# Patient Record
Sex: FEMALE | Race: WHITE | NOT HISPANIC OR LATINO | Employment: OTHER | URBAN - METROPOLITAN AREA
[De-identification: names, ages, dates, MRNs, and addresses within clinical notes are randomized per-mention and may not be internally consistent; named-entity substitution may affect disease eponyms.]

---

## 2021-01-08 ENCOUNTER — TRANSFERRED RECORDS (OUTPATIENT)
Dept: HEALTH INFORMATION MANAGEMENT | Facility: CLINIC | Age: 71
End: 2021-01-08

## 2021-01-15 ENCOUNTER — TRANSFERRED RECORDS (OUTPATIENT)
Dept: HEALTH INFORMATION MANAGEMENT | Facility: CLINIC | Age: 71
End: 2021-01-15

## 2021-02-16 ENCOUNTER — TELEPHONE (OUTPATIENT)
Dept: CARDIOLOGY | Facility: CLINIC | Age: 71
End: 2021-02-16

## 2021-02-16 NOTE — TELEPHONE ENCOUNTER
02/16/21 Fax sent to [facility] Medical Records to request tracings from Ziopatch and Holter from 1/29/21 and EKG from 1/8/21.   Chon 330 pm

## 2021-02-17 NOTE — TELEPHONE ENCOUNTER
Spoke to Rosalinda in medical records at [facility] requesting trracing of recent EKG, 48 hour holter and Zio Patch.  She indicated she received the fax request and will put a rush on getting the documents over to us for appt tomorrow with Dr Vuong.  VINOD Naranjo

## 2021-02-17 NOTE — TELEPHONE ENCOUNTER
Received only EKG tracing from 1/8/2021.  Called an spoke to Cecil who reports there are more tracings coming for the 48 hour holter and Zio Patch.  She thought they would be coming soon.  If we have any further issues we were to refer to request number [id number].  VINOD Naranjo

## 2021-02-18 ENCOUNTER — OFFICE VISIT (OUTPATIENT)
Dept: CARDIOLOGY | Facility: CLINIC | Age: 71
End: 2021-02-18
Payer: COMMERCIAL

## 2021-02-18 VITALS
OXYGEN SATURATION: 96 % | WEIGHT: 191.1 LBS | SYSTOLIC BLOOD PRESSURE: 148 MMHG | HEART RATE: 105 BPM | DIASTOLIC BLOOD PRESSURE: 92 MMHG

## 2021-02-18 DIAGNOSIS — R00.2 PALPITATIONS: ICD-10-CM

## 2021-02-18 DIAGNOSIS — I48.0 PAROXYSMAL ATRIAL FIBRILLATION (H): Primary | ICD-10-CM

## 2021-02-18 PROCEDURE — 99204 OFFICE O/P NEW MOD 45 MIN: CPT | Performed by: INTERNAL MEDICINE

## 2021-02-18 PROCEDURE — 93000 ELECTROCARDIOGRAM COMPLETE: CPT | Performed by: INTERNAL MEDICINE

## 2021-02-18 RX ORDER — METOPROLOL SUCCINATE 50 MG/1
50 TABLET, EXTENDED RELEASE ORAL DAILY
Qty: 30 TABLET | Refills: 11 | Status: SHIPPED | OUTPATIENT
Start: 2021-02-18 | End: 2021-02-24

## 2021-02-18 NOTE — TELEPHONE ENCOUNTER
Spoke to Rosalinda and let her know that the ZP monitor strips have still not arrived.  She will put a rush on getting the monitor strips over and is aware that OV is this afternoon. Som

## 2021-02-18 NOTE — PATIENT INSTRUCTIONS
1.  You have paroxysmal atrial fibrillation.  On occasion, your heart rate becomes irregular and races.  Typically, your episodes only last few minutes.  We picked up a 25-minute AF episode on your recent cardiac monitor (this was the longest).  2.  This condition increases your risk of stroke.  We recommend you start a blood thinner called Eliquis.  This should be taken twice daily.  3.  Please start a medication called metoprolol XL which is a beta-blocker.  This will help your heart race less and perhaps prevent episodes of atrial fibrillation.  Please start half a tablet (25 mg) daily.  If you have no issues, please increase to the full 50 mg tablet 1 week later.  4.  Echocardiogram.  5.  We will see her again in 4 to 6 weeks.  6.  Because atrial fibrillation is frequently on the basis of sleep apnea, we will refer you to a sleep specialist for evaluation.

## 2021-02-18 NOTE — PROGRESS NOTES
HPI and Plan:   See dictation [id number]    Orders Placed This Encounter   Procedures     SLEEP EVALUATION & MANAGEMENT REFERRAL - [facility] [phone]  (Age 18 and up)     Follow-Up with Cardiac Advanced Practice Provider     EKG 12-lead complete w/read - Clinics (performed today)     Echocardiogram Complete       Orders Placed This Encounter   Medications     apixaban ANTICOAGULANT (ELIQUIS ANTICOAGULANT) 5 MG tablet     Sig: Take 1 tablet (5 mg) by mouth 2 times daily     Dispense:  60 tablet     Refill:  11     metoprolol succinate ER (TOPROL XL) 50 MG 24 hr tablet     Sig: Take 1 tablet (50 mg) by mouth daily     Dispense:  30 tablet     Refill:  11       There are no discontinued medications.      Encounter Diagnoses   Name Primary?     Paroxysmal atrial fibrillation (H) Yes     Palpitations        CURRENT MEDICATIONS:  Current Outpatient Medications   Medication Sig Dispense Refill     apixaban ANTICOAGULANT (ELIQUIS ANTICOAGULANT) 5 MG tablet Take 1 tablet (5 mg) by mouth 2 times daily 60 tablet 11     metoprolol succinate ER (TOPROL XL) 50 MG 24 hr tablet Take 1 tablet (50 mg) by mouth daily 30 tablet 11       ALLERGIES     Allergies   Allergen Reactions     Penicillins        PAST MEDICAL HISTORY:  History reviewed. No pertinent past medical history.    PAST SURGICAL HISTORY:  History reviewed. No pertinent surgical history.    FAMILY HISTORY:  History reviewed. No pertinent family history.    SOCIAL HISTORY:  Social History     Socioeconomic History     Marital status:      Spouse name: None     Number of children: None     Years of education: None     Highest education level: None   Occupational History     None   Social Needs     Financial resource strain: None     Food insecurity     Worry: None     Inability: None     Transportation needs     Medical: None     Non-medical: None   Tobacco Use     Smoking status: Never Smoker     Smokeless tobacco: Never Used    Substance and Sexual Activity     Alcohol use: Not Currently     Drug use: None     Sexual activity: None   Lifestyle     Physical activity     Days per week: None     Minutes per session: None     Stress: None   Relationships     Social connections     Talks on phone: None     Gets together: None     Attends Nondenominational service: None     Active member of club or organization: None     Attends meetings of clubs or organizations: None     Relationship status: None     Intimate partner violence     Fear of current or ex partner: None     Emotionally abused: None     Physically abused: None     Forced sexual activity: None   Other Topics Concern     Parent/sibling w/ CABG, MI or angioplasty before 65F 55M? Not Asked   Social History Narrative     None       Review of Systems:  Skin:  Negative       Eyes:  Positive for glasses    ENT:  Negative      Respiratory:  Negative       Cardiovascular:    palpitations;Positive for;lightheadedness    Gastroenterology: Negative      Genitourinary:  Negative      Musculoskeletal:  Negative      Neurologic:  Negative      Psychiatric:  Positive for anxiety    Heme/Lymph/Imm:  Negative      Endocrine:  Negative

## 2021-02-18 NOTE — LETTER
2/18/2021    Tara Ann Nelson, MD [facility] [address] [place] [address]    RE: Sheri Joseph       Dear Colleague,    I had the pleasure of seeing Sheri Joseph in the [facility].    HPI and Plan:   See dictation [id number]    Orders Placed This Encounter   Procedures     SLEEP EVALUATION & MANAGEMENT REFERRAL - [facility] [phone]  (Age 18 and up)     Follow-Up with Cardiac Advanced Practice Provider     EKG 12-lead complete w/read - Clinics (performed today)     Echocardiogram Complete       Orders Placed This Encounter   Medications     apixaban ANTICOAGULANT (ELIQUIS ANTICOAGULANT) 5 MG tablet     Sig: Take 1 tablet (5 mg) by mouth 2 times daily     Dispense:  60 tablet     Refill:  11     metoprolol succinate ER (TOPROL XL) 50 MG 24 hr tablet     Sig: Take 1 tablet (50 mg) by mouth daily     Dispense:  30 tablet     Refill:  11       There are no discontinued medications.      Encounter Diagnoses   Name Primary?     Paroxysmal atrial fibrillation (H) Yes     Palpitations        CURRENT MEDICATIONS:  Current Outpatient Medications   Medication Sig Dispense Refill     apixaban ANTICOAGULANT (ELIQUIS ANTICOAGULANT) 5 MG tablet Take 1 tablet (5 mg) by mouth 2 times daily 60 tablet 11     metoprolol succinate ER (TOPROL XL) 50 MG 24 hr tablet Take 1 tablet (50 mg) by mouth daily 30 tablet 11       ALLERGIES     Allergies   Allergen Reactions     Penicillins        PAST MEDICAL HISTORY:  History reviewed. No pertinent past medical history.    PAST SURGICAL HISTORY:  History reviewed. No pertinent surgical history.    FAMILY HISTORY:  History reviewed. No pertinent family history.    SOCIAL HISTORY:  Social History     Socioeconomic History     Marital status:      Spouse name: None     Number of children: None     Years of education: None     Highest education level: None   Occupational  History     None   Social Needs     Financial resource strain: None     Food insecurity     Worry: None     Inability: None     Transportation needs     Medical: None     Non-medical: None   Tobacco Use     Smoking status: Never Smoker     Smokeless tobacco: Never Used   Substance and Sexual Activity     Alcohol use: Not Currently     Drug use: None     Sexual activity: None   Lifestyle     Physical activity     Days per week: None     Minutes per session: None     Stress: None   Relationships     Social connections     Talks on phone: None     Gets together: None     Attends Orthodoxy service: None     Active member of club or organization: None     Attends meetings of clubs or organizations: None     Relationship status: None     Intimate partner violence     Fear of current or ex partner: None     Emotionally abused: None     Physically abused: None     Forced sexual activity: None   Other Topics Concern     Parent/sibling w/ CABG, MI or angioplasty before 65F 55M? Not Asked   Social History Narrative     None       Review of Systems:  Skin:  Negative       Eyes:  Positive for glasses    ENT:  Negative      Respiratory:  Negative       Cardiovascular:    palpitations;Positive for;lightheadedness    Gastroenterology: Negative      Genitourinary:  Negative      Musculoskeletal:  Negative      Neurologic:  Negative      Psychiatric:  Positive for anxiety    Heme/Lymph/Imm:  Negative      Endocrine:  Negative                      Thank you for allowing me to participate in the care of your patient.      Sincerely,     Tony Vuong MD     [facility]  cc:   No referring provider defined for this encounter.

## 2021-02-18 NOTE — PROGRESS NOTES
Service Date: 02/18/2021      HISTORY OF PRESENT ILLNESS:    It is my pleasure seeing Ms. Sheri Joseph, a delightful 70-year-old female who has been referred for evaluation of palpitation.      Approximately 18 months ago, the patient was monitoring her heart rate meticulously as part of an exercise program prescribed by her .  She noticed high and low heart rates on her Apple watch.  She felt well overall and did not pay much attention to this.      In 11/2020 shortly after returning home from a walk, she felt her heart racing for 3-4 minutes.       In 12/2020, she had a 15-20 minute episode of heart racing where she felt chest tightness and was mildly lightheaded.  This event prompted further evaluation.  Her primary care provider ordered a 14-day Zio Patch monitor.  I have reviewed this monitor which shows paroxysmal atrial fibrillation with impressive RVR, rates in the 170-200 range.  The longest event lasted approximately 25 minutes.  [place] was less than 1%.      The patient has a past medical history of GERD for which she previously took omeprazole.  She is currently on no daily medication.  She does not have history of hypertension or other cardiovascular problem.      She is physically active without chest pain or other symptoms during exertion.  She lives with her  in [facility].  She is a nonsmoker, nondrinker.  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      FAMILY HISTORY:  Negative for premature heart disease or atrial fibrillation.        PHYSICAL EXAMINATION:   VITAL SIGNS:  Blood pressure 148/92, heart rate 95 and regular, weight 86.7 kg.   GENERAL:  She is an extremely pleasant woman in no distress.   HEENT:  Normocephalic, atraumatic.   NECK:  Supple without carotid bruits.  Normal JVP.   LUNGS:  Clear.   CARDIOVASCULAR:  Regular rhythm without gallop, murmur or rub.   ABDOMEN:  Soft, nontender.   EXTREMITIES:  No edema.    SKIN:  No rashes.        DIAGNOSTIC STUDIES:    I personally reviewed the following:  - Recent laboratory tests:  TSH 0.87, sodium 140, potassium 4.3, creatinine 0.76, hematocrit 45%.     - 12-lead ECG today showed sinus rhythm and was within normal limits.        IMPRESSION:   1.  Paroxysmal atrial fibrillation.  Ms. Joseph is a delightful 70-year-old female with recent diagnosis of paroxysmal atrial fibrillation.  Low burden (<1%), though episodes are typically associated with very rapid ventricular rates and significant symptoms.      There is no apparent cause for atrial fibrillation other than age and possibly sleep apnea.  The patient is known to snore, but her overall sleep quality is decent.  Referral to Sleep Medicine was discussed.      With regard to anticoagulation, her [id number]-[id number] score is probably 2 (age, gender) and perhaps 3 (she was mildly hypertensive today).  Start apixaban.      With regard to rhythm versus rate control, I would favor the latter for now.  I discussed with the patient we have a broad range of therapeutic interventions for AF.  As a first step, I recommended a beta-blocker.  If this fails, we would consider flecainide or catheter ablation.      We discussed lifestyle measures that may decrease the risk of atrial fibrillation such as weight loss, continuation of regular exercise and avoidance of alcohol.      RECOMMENDATIONS:   A.  Start apixaban 5 mg b.i.d.   B.  Start Toprol-XL 25 mg daily.  If she has no side effects, increase to 50 mg daily after 1 week.   C.  Echocardiogram.   D.  Follow up with EP MARIA ISABEL in 4-6 weeks.   E.  Referral to sleep specialist.   F.  Monitor BP.      It was my pleasure seeing Ms. Joseph today.  Please feel free to contact me with any questions.        IQRA ULLOA MD, [facility]          cc:   Roberta Thayer MD   [facility]   [address] [place]  [address]          D: 02/18/2021   T: 02/18/2021   MT: lauren      Name:     IESHA  DOMINGUEZ   MRN:      [id number]        Account:      [id number]   :      1950           Service Date: 2021      Document: [id number]

## 2021-02-18 NOTE — LETTER
2/18/2021      Tara Ann Nelson, MD [facility] [address] [facility] [address]      RE: Sheri Joseph       Dear Colleague,    I had the pleasure of seeing Sheri Joseph in the [facility].    Service Date: 02/18/2021      HISTORY OF PRESENT ILLNESS:  It is my pleasure seeing Ms. Sheri Joseph, a delightful 70-year-old female who has been referred for evaluation of palpitation.      Approximately 18 months ago, the patient was monitoring her heart rate meticulously as part of a training program prescribed by her .  She noticed that there were some high and low heart rates on her Apple watch.  She felt well overall and did not pay much attention to this.      In 11/2020 after coming back home from a walk, she felt her heart racing for 3 or 4 minutes.  The arrhythmia spontaneously subsided.      In 12/2020, she had a 15 to 20-minute episode of heart racing where she felt chest tightness and was mildly lightheaded.  This event prompted further evaluation.  Her primary care provider ordered a 14-day Zio Patch monitor.  I have reviewed this cardiac monitor which shows paroxysmal atrial fibrillation with impressive RVR, rates in the 170-200 range.  The longest event lasted approximately 25 minutes.  Overall burden was less than 1%.      The patient has a past medical history of GERD for which she took omeprazole for a while.  She is currently on no medication.  She does not have history of hypertension or other cardiovascular problem.      She is physically active without chest pain or other symptoms during exertion.  She lives with her  in [facility].  She is a nonsmoker, nondrinker.  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      FAMILY HISTORY:  Negative for premature heart disease or atrial fibrillation.      PHYSICAL EXAMINATION:   VITAL SIGNS:  Blood pressure  148/92, heart rate 95 and regular, weight 86.7 kg.   GENERAL:  She is an extremely pleasant woman in no distress.   HEENT:  Normocephalic, atraumatic.   NECK:  Supple without carotid bruits.  Normal JVP.   LUNGS:  Clear.   CARDIOVASCULAR:  Regular rhythm without gallop, murmur or rub.   ABDOMEN:  Soft, nontender.   EXTREMITIES:  No edema.   SKIN:  No rashes.      DIAGNOSTIC STUDIES:  Recent laboratory tests:  TSH 0.87, sodium 140, potassium 4.3, creatinine 0.76, hematocrit 45%.        Her 12-lead ECG today showed sinus rhythm and was within normal limits.      IMPRESSION:   1.  Paroxysmal atrial fibrillation.  Ms. Joseph is a delightful 70-year-old female with recent diagnosis of paroxysmal atrial fibrillation.  Overall, low burden (less than 1%), but her brief episodes are typically associated with very rapid ventricular rate, which causes significant symptoms.      There is no apparent cause for atrial fibrillation other than age and possibly sleep apnea.  The patient is known to snore, but her sleep quality is generally decent.  Referral to Sleep Medicine is reasonable.      With regard to anticoagulation, her CHADS2-VASc score is probably 2 (age, gender) and perhaps 3 (she was mildly hypertensive today).  I will start apixaban.      With regard to rhythm versus rate control, I would favor the latter for now.  I discussed with the patient we have a broad range of therapeutic interventions for AFib.  As a first step, I would recommend a trial of beta blocker.  If this fails, I would then consider either flecainide or catheter ablation.      We also discussed lifestyle measures that may decrease the risk of atrial fibrillation such as weight loss, continuation of regular exercise and avoidance of alcohol.      RECOMMENDATIONS:   A.  Start apixaban 5 mg b.i.d.   B.  Start Toprol-XL 25 mg daily.  If she has no side effects, increase to 50 mg daily after 1 week.   C.  Echocardiogram.   D.  Follow up with MARIA ISABEL in 4-6  weeks.   E.  Referral to sleep specialist.      It was my pleasure seeing Ms. Joseph today.  Please feel free to contact me with any questions.      cc:   Roberta Thayer MD   [facility]   [address] [place]  [address]         IQRA ULLOA MD         D: 2021   T: 2021   MT: lauren      Name:     DOMINGUEZ JOSEPH   MRN:      [id number]        Account:      [id number]   :      1950           Service Date: 2021      Document: [id number]        Outpatient Encounter Medications as of 2021   Medication Sig Dispense Refill     apixaban ANTICOAGULANT (ELIQUIS ANTICOAGULANT) 5 MG tablet Take 1 tablet (5 mg) by mouth 2 times daily 60 tablet 11     metoprolol succinate ER (TOPROL XL) 50 MG 24 hr tablet Take 1 tablet (50 mg) by mouth daily 30 tablet 11     No facility-administered encounter medications on file as of 2021.        Again, thank you for allowing me to participate in the care of your patient.      Sincerely,    Iqra Ulloa MD     [facility]

## 2021-02-22 ENCOUNTER — TELEPHONE (OUTPATIENT)
Dept: CARDIOLOGY | Facility: CLINIC | Age: 71
End: 2021-02-22

## 2021-02-22 DIAGNOSIS — I48.0 PAROXYSMAL ATRIAL FIBRILLATION (H): ICD-10-CM

## 2021-02-22 NOTE — TELEPHONE ENCOUNTER
Patient called to report since starting metoprolol (2/19) she has been feeling lousy.  Reports fatigue, SOB, leg heaviness and foggy.  Reports HR varying and can be as high as 100.  Not sure if she is having side effect of the medications.or from having bouts of AF.  Currently taking metoprolol in the morning.  Recommended that patient try to take medication in the evening to see if her symptoms improve.  Also keep diary of HR and symptoms when elevated.  Asked patient to call with update on Thursday to see if symptoms improve.  Patient provided verbal understanding regarding above.  VINOD Naranjo

## 2021-02-22 NOTE — TELEPHONE ENCOUNTER
02/22/21 EKG tracing dated 01/08/21 and Ziopatch tracings dated 01/15/21-01/19/21 recd from [facility]. Sent to HIM for scanning.  Chon Gannon5 am

## 2021-02-24 RX ORDER — METOPROLOL SUCCINATE 50 MG/1
50 TABLET, EXTENDED RELEASE ORAL AT BEDTIME
Qty: 30 TABLET | Refills: 11
Start: 2021-02-24 | End: 2021-03-02 | Stop reason: SINTOL

## 2021-02-24 NOTE — TELEPHONE ENCOUNTER
I would stop Metoprolol. If she would like to try Flecainide rather than ablation then we should change her echo to exercise nucl prior to start Flecainide 50 mg bid. If wants an ablation she can talk to Dr. Vuong next week. qp

## 2021-02-24 NOTE — TELEPHONE ENCOUNTER
02/24/21  Msg recd from Dr Bartlett  I would stop Metoprolol. If she would like to try Flecainide rather than ablation then we should change her echo to exercise nucl prior to start Flecainide 50 mg bid. If wants an ablation she can talk to Dr. Vuong next week  Spoke with pt and explained recommendation per Dr Bartlett  In Dr Vuong absence.  Pt stated she has not tried taking the Metoprolol at bedtime which was recommended to her a few days ago before making any other changes.  Plan was made for pt to take at HS and call back by end of week if symptoms have not subsided. If feeling better she will call early next week with update. Echo planned for tomorrow morning moved out until 3/8 incase pt needs to stop Metoprolol and initiate Flecainide.   Pt voiced understanding and agreement with plan.   Chon 450 pm

## 2021-02-24 NOTE — TELEPHONE ENCOUNTER
"02/24/21 Pt called again with complaints of not feeling well after Metoprolol 25 mg ER was started on 2/19. . Today she has had 2 episodes of feeling \" extremely weak and fatigued\" . She was so tired this moring she couldn't get herself out of bed. Later this morning she had a short episode of feeling \"pins and needles \" in her feet which concerned her.. BP today has been 118/79 and pulse running 60-70 on her Apple Watch.   Will check with Dr Bartlett in Dr Vuong absence and call pt back w recommendations.  Pt voiced understanding and agreement with plan.   Chon 247 pm  "

## 2021-03-01 NOTE — TELEPHONE ENCOUNTER
Patient called with update--she switched her metoprolol XL to evening and did feel it helped some with her fatigue and weakness however continues to not feel well.  Patient unsure if her feeling of fatigue is related to her having bouts of AF and feeling drained after it is over or the medication.  She also admits to having anxiety which is untreated.  She reports she has discussed this with her PCP and it has never been addressed.  She reports her apple watch (series 3 does not record AF episodes) showed she had a 30 minute episode while sleeping last night where her HR was 118.  Will update Dr Vuong regarding above for recommendations.  VINOD Naranjo

## 2021-03-01 NOTE — TELEPHONE ENCOUNTER
Message left for patient to review recommendations per Dr Vuong:.  Awaiting return call from patient.  VINOD Naranjo

## 2021-03-01 NOTE — TELEPHONE ENCOUNTER
"In that case, let's stop metoprolol.    Let's try dilt  mg daily instead.  This is less likely to cause severe fatigue.     Her AF is associated with impressive RVR (we saw it on her ZP).  Would avoid flecainide as \"solo therapy\" given such rapid AF.  Flecainide needs to be used TOGETHER with AV node controlling agent.  We may it add later (to the dilt, if she tolerates it),     Daviex, DI  "

## 2021-03-02 ENCOUNTER — VIRTUAL VISIT (OUTPATIENT)
Dept: SLEEP MEDICINE | Facility: CLINIC | Age: 71
End: 2021-03-02
Attending: INTERNAL MEDICINE
Payer: COMMERCIAL

## 2021-03-02 VITALS — BODY MASS INDEX: 31.76 KG/M2 | WEIGHT: 186 LBS | HEIGHT: 64 IN

## 2021-03-02 DIAGNOSIS — R29.818 SUSPECTED SLEEP APNEA: Primary | ICD-10-CM

## 2021-03-02 DIAGNOSIS — I48.0 PAROXYSMAL ATRIAL FIBRILLATION (H): ICD-10-CM

## 2021-03-02 DIAGNOSIS — R06.83 SNORING: ICD-10-CM

## 2021-03-02 PROCEDURE — 99204 OFFICE O/P NEW MOD 45 MIN: CPT | Mod: 95 | Performed by: INTERNAL MEDICINE

## 2021-03-02 RX ORDER — DILTIAZEM HYDROCHLORIDE 180 MG/1
180 CAPSULE, COATED, EXTENDED RELEASE ORAL DAILY
Qty: 30 CAPSULE | Refills: 3 | Status: SHIPPED | OUTPATIENT
Start: 2021-03-02 | End: 2021-06-21

## 2021-03-02 ASSESSMENT — MIFFLIN-ST. JEOR: SCORE: 1343.69

## 2021-03-02 NOTE — PROGRESS NOTES
Sheri is a 70 year old who is being evaluated via a billable video visit.      How would you like to obtain your AVS? MyChart  If the video visit is dropped, the invitation should be resent by: Send to e-mail at: [email or web address]  Will anyone else be joining your video visit? No       Anjana Orellana, RONNIE on 3/2/2021 at 9:11 AM    Video Start Time: 10:00 AM  Video-Visit Details    Type of service:  Video Visit    Video End Time:10:25 am    Originating Location (pt. Location): Home    Distant Location (provider location):  [facility] [place]     Platform used for Video Visit: Ardelyx    Sleep Consultation:    Date on this visit: 3/2/2021    Sheri Joseph is sent by Tony Montano for a sleep consultation regarding possible sleep apnea.    Primary Physician: Roberta Thayer     Sheri Joseph is a 70 years old female with paroxysmal atrial fibrillation who is sent for an evaluation of sleep disordered breathing by Cardiology.      Mrs. Sheri Joseph reports that she has never been a good sleeper. She has difficulty falling asleep and have frequent arousals during the night. 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 However, she did not consider her symptomatic burden to be high to warrant a sleep evaluation.     Since starting Metoprolol for rate control, she has become more tired.     Sheri does snore frequently. Patient does have a regular bed partner. There is report of snoring and poor quality of sleep.  She does not have witnessed apneas. They never sleep separately.  Patient sleeps on her side. She denies no morning headaches and restless legs. Sheri denies any bruxism, sleep walking, sleep talking, dream enactment, sleep paralysis, cataplexy and hypnogogic/hypnopompic hallucinations.    Sheri goes to sleep at 10:00 PM during the week. She wakes up at 7:00 AM without an alarm. She falls asleep in 30 minutes to 2 hours of reading  in bed.  Sheri has difficulty falling asleep.  She wakes up 3-4 times a night for 10- 20 minutes before falling back to sleep.  Sheri wakes up to uncertain reasons.  On weekends, Sheri goes to sleep at 10:00 PM.  She wakes up at 7:00 AM without an alarm. She falls asleep in 60 minutes.  Patient gets an average of 7 hours of sleep per night.     Sheri denies difficulty breathing through her nose.      Patient's [place] Sleepiness score 5/24 consistent with no daytime sleepiness.      Sheri naps 0 times per week . She takes no inadvertant naps.  She denies closing eyes, dozing and falling asleep while driving.  Patient was counseled on the importance of driving while alert, to pull over if drowsy, or nap before getting into the vehicle if sleepy.      She uses 1 cups/day of coffee. Last caffeine intake is usually before noon.    Allergies:    Allergies   Allergen Reactions     Penicillins        Medications:    Current Outpatient Medications   Medication Sig Dispense Refill     apixaban ANTICOAGULANT (ELIQUIS ANTICOAGULANT) 5 MG tablet Take 1 tablet (5 mg) by mouth 2 times daily 60 tablet 11     metoprolol succinate ER (TOPROL XL) 50 MG 24 hr tablet Take 1 tablet (50 mg) by mouth At Bedtime 30 tablet 11       Problem List:  There are no active problems to display for this patient.       Past Medical/Surgical History:  Past Medical History:   Diagnosis Date     Paroxysmal A-fib (H)      No past surgical history on file.    Social History:  Social History     Socioeconomic History     Marital status:      Spouse name: Not on file     Number of children: Not on file     Years of education: Not on file     Highest education level: Not on file   Occupational History     Not on file   Social Needs     Financial resource strain: Not on file     Food insecurity     Worry: Not on file     Inability: Not on file     Transportation needs     Medical: Not on file     Non-medical: Not on file   Tobacco Use     Smoking  status: Never Smoker     Smokeless tobacco: Never Used   Substance and Sexual Activity     Alcohol use: Not Currently     Drug use: Not on file     Sexual activity: Not on file   Lifestyle     Physical activity     Days per week: Not on file     Minutes per session: Not on file     Stress: Not on file   Relationships     Social connections     Talks on phone: Not on file     Gets together: Not on file     Attends Mandaen service: Not on file     Active member of club or organization: Not on file     Attends meetings of clubs or organizations: Not on file     Relationship status: Not on file     Intimate partner violence     Fear of current or ex partner: Not on file     Emotionally abused: Not on file     Physically abused: Not on file     Forced sexual activity: Not on file   Other Topics Concern     Parent/sibling w/ CABG, MI or angioplasty before 65F 55M? Not Asked   Social History Narrative     Not on file       Family History:    Both parents had sleep apnea.     Review of Systems:  A complete review of systems reviewed by me is negative with the exeption of what has been mentioned in the history of present illness.  CONSTITUTIONAL: NEGATIVE for weight gain/loss, fever, chills, sweats or night sweats, drug allergies.  EYES: NEGATIVE for changes in vision, blind spots, double vision.  ENT: NEGATIVE for ear pain, sore throat, sinus pain, post-nasal drip, runny nose, bloody nose  CARDIAC: NEGATIVE for fast heartbeats or fluttering in chest, chest pain or pressure, breathlessness when lying flat, swollen legs or swollen feet.  NEUROLOGIC: NEGATIVE headaches, weakness or numbness in the arms or legs.  DERMATOLOGIC: NEGATIVE for rashes, new moles or change in mole(s)  PULMONARY: NEGATIVE SOB at rest, SOB with activity, dry cough, productive cough, coughing up blood, wheezing or whistling when breathing.    GASTROINTESTINAL: NEGATIVE for nausea or vomitting, loose or watery stools, fat or grease in stools,  "constipation, abdominal pain, bowel movements black in color or blood noted.  GENITOURINARY: NEGATIVE for pain during urination, blood in urine, urinating more frequently than usual, irregular menstrual periods.  MUSCULOSKELETAL: NEGATIVE for muscle pain, bone or joint pain, swollen joints.  ENDOCRINE: NEGATIVE for increased thirst or urination, diabetes.  LYMPHATIC: NEGATIVE for swollen lymph nodes, lumps or bumps in the breasts or nipple discharge.    Physical Examination:  Vitals: Ht 1.626 m (5' 4\")   Wt 84.4 kg (186 lb)   BMI 31.93 kg/m    BMI= Body mass index is 31.93 kg/m .         [place] Total Score 2/27/2021   Total score - [place] 5            GENERAL APPEARANCE: healthy, alert, active and no distress  RESP: Negative for cough or dyspnea   NEURO: mentation intact and speech normal  PSYCH: mentation appears normal and affect normal/bright      Impression/Plan:    1. To rule out obstructive sleep apnea  2. Paroxysmal atrial fibrillation     Patient is a 70 years old female, with paroxysmal atrial fibrillation, who has a history of snoring, sleep maintenance difficulty and daytime fatigue. There is an intermediate risk for sleep apnea and an overnight sleep study is recommended to assess for sleep disordered breathing exacerbating arrhythmia risk. Pre test probability for moderate to severe sleep apnea is low and a in lab PSG is recommended for assessment.     Plan:     1. PSG for assessment of sleep apnea       Literature provided regarding sleep apnea.      She will follow up with me in approximately two weeks after her sleep study has been competed to review the results and discuss plan of care.       Polysomnography & HST reviewed.  Obstructive sleep apnea reviewed.  Complications of untreated sleep apnea were reviewed.    I spent a total of 45 minutes for this appointment today which include time spent before, during and after the visit for patient care, counseling and coordination of care.    " Taj Pickens     CC: Tony Ruiz Is*

## 2021-03-02 NOTE — TELEPHONE ENCOUNTER
"Spoke to patient with recommendations per Dr Vuong:  ----In that case, let's stop metoprolol.  ----Let's try dilt  mg daily instead.  This is less likely to cause severe fatigue.   Her AF is associated with impressive RVR (we saw it on her ZP).  Would avoid flecainide as \"solo therapy\" given such rapid AF.  Flecainide needs to be used TOGETHER with AV node controlling agent.  We may it add later (to the dilt, if she tolerates it),  Patient agreed with plan. Medication list updated in epic.  Will send to preferred pharmacy ([facility]). Patient requesting 30 days only at a time.   Asked patient to call with update after she has taken it for a few days. Patient provided verbal understanding regarding above.  VINOD Naranjo  "

## 2021-03-02 NOTE — PATIENT INSTRUCTIONS
Your BMI is Body mass index is 31.93 kg/m .  Weight management is a personal decision.  If you are interested in exploring weight loss strategies, the following discussion covers the approaches that may be successful. Body mass index (BMI) is one way to tell whether you are at a healthy weight, overweight, or obese. It measures your weight in relation to your height.  A BMI of 18.5 to 24.9 is in the healthy range. A person with a BMI of 25 to 29.9 is considered overweight, and someone with a BMI of 30 or greater is considered obese. More than two-thirds of American adults are considered overweight or obese.  Being overweight or obese increases the risk for further weight gain. Excess weight may lead to heart disease and diabetes.  Creating and following plans for healthy eating and physical activity may help you improve your health.  Weight control is part of healthy lifestyle and includes exercise, emotional health, and healthy eating habits. Careful eating habits lifelong are the mainstay of weight control. Though there are significant health benefits from weight loss, long-term weight loss with diet alone may be very difficult to achieve- studies show long-term success with dietary management in less than 10% of people. Attaining a healthy weight may be especially difficult to achieve in those with severe obesity. In some cases, medications, devices and surgical management might be considered.  What can you do?  If you are overweight or obese and are interested in methods for weight loss, you should discuss this with your provider.     Consider reducing daily calorie intake by 500 calories.     Keep a food journal.     Avoiding skipping meals, consider cutting portions instead.    Diet combined with exercise helps maintain muscle while optimizing fat loss. Strength training is particularly important for building and maintaining muscle mass. Exercise helps reduce stress, increase energy, and improves fitness.  Increasing exercise without diet control, however, may not burn enough calories to loose weight.       Start walking three days a week 10-20 minutes at a time    Work towards walking thirty minutes five days a week     Eventually, increase the speed of your walking for 1-2 minutes at time    In addition, we recommend that you review healthy lifestyles and methods for weight loss available through the National Institutes of Health patient information sites:  http://win.niddk.nih.gov/publications/index.htm    And look into health and wellness programs that may be available through your health insurance provider, employer, local community center, or gisele club.    Weight management plan: Patient was referred to their PCP to discuss a diet and exercise plan.

## 2021-03-08 ENCOUNTER — HOSPITAL ENCOUNTER (OUTPATIENT)
Dept: CARDIOLOGY | Facility: CLINIC | Age: 71
Discharge: HOME | End: 2021-03-08
Attending: INTERNAL MEDICINE | Admitting: INTERNAL MEDICINE
Payer: COMMERCIAL

## 2021-03-08 DIAGNOSIS — I48.0 PAROXYSMAL ATRIAL FIBRILLATION (H): ICD-10-CM

## 2021-03-08 PROCEDURE — 93306 TTE W/DOPPLER COMPLETE: CPT | Mod: 26 | Performed by: INTERNAL MEDICINE

## 2021-03-08 PROCEDURE — 93306 TTE W/DOPPLER COMPLETE: CPT

## 2021-03-16 ENCOUNTER — TELEPHONE (OUTPATIENT)
Dept: CARDIOLOGY | Facility: CLINIC | Age: 71
End: 2021-03-16

## 2021-03-16 NOTE — TELEPHONE ENCOUNTER
3/16/21 Called pt in follow up to changing from Metoprolol to Diltiazem on 3/1. She states her HR has been controlled. According to her Apple watch, Hrs have been mostly in the 60's. At rest it has gone as low as 48.  She stated she still has some numbness and tingling in her hands and feet and her hands become discolored when she goes on walks but this seems to be improving.   Overall she thinks she is doing better. INfromed pt I will add information so Sarah is aware for OV tomorrow.  Pt voiced understanding and agreement with plan.   Chon 10 am

## 2021-03-17 ENCOUNTER — TELEPHONE (OUTPATIENT)
Dept: CARDIOLOGY | Facility: CLINIC | Age: 71
End: 2021-03-17

## 2021-03-17 ENCOUNTER — OFFICE VISIT (OUTPATIENT)
Dept: CARDIOLOGY | Facility: CLINIC | Age: 71
End: 2021-03-17
Payer: COMMERCIAL

## 2021-03-17 VITALS
HEIGHT: 64 IN | WEIGHT: 189 LBS | DIASTOLIC BLOOD PRESSURE: 80 MMHG | SYSTOLIC BLOOD PRESSURE: 132 MMHG | HEART RATE: 90 BPM | BODY MASS INDEX: 32.27 KG/M2

## 2021-03-17 DIAGNOSIS — R00.2 PALPITATIONS: ICD-10-CM

## 2021-03-17 DIAGNOSIS — I48.0 PAROXYSMAL ATRIAL FIBRILLATION (H): Primary | ICD-10-CM

## 2021-03-17 DIAGNOSIS — K22.70 BARRETT'S ESOPHAGUS WITHOUT DYSPLASIA: ICD-10-CM

## 2021-03-17 PROCEDURE — 99214 OFFICE O/P EST MOD 30 MIN: CPT | Performed by: NURSE PRACTITIONER

## 2021-03-17 ASSESSMENT — MIFFLIN-ST. JEOR: SCORE: 1357.3

## 2021-03-17 NOTE — PROGRESS NOTES
Service Date: 03/17/2021      HISTORY OF PRESENT ILLNESS:  Ms. Joseph is a delightful 70-year-old woman that I am having the pleasure of meeting today.  She was seen in consultation by Dr. Vuong on 02/18/2021.      The patient developed high heart rates and low heart rates noted on her Apple watch about a year and a half ago.  Initially, she did not pay attention to this.  In 12/2020, she had a 15-20 minute episode of heart racing, where she felt chest tightness and was mildly lightheaded.  Her primary care doctor ordered a 14-day Zio patch.  This monitor showed paroxysmal atrial fibrillation with RVR.  Rate ranged up to 170-200 beats per minute.  Longest episode lasted 25 minutes.  [place] was less than 1%.  Slowest heart rate was little after 10:00 p.m. at 41 beats per minute.      She has a history of Herbert esophagus and is on omeprazole for GERD.  She states that she did resume taking this medication recently.      She also suffers from mild anxiety disorder.  She was reluctant to take medication, but did meet with her primary care doctor and plans to do so.        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      Last 12-lead EKG was in February and showed sinus rhythm at 93 beats per minute.      It should be noted she was initially placed on metoprolol by Dr. Vuong.  The patient developed bradycardia and just did not feel right.  He switched her to diltiazem 180 mg daily.  She states she was just starting to get used to the metoprolol when she switched to diltiazem.  She started having some numbness and tingling in her face and feet and states that this is also improved.  She is taking it at night, but feels she would tolerate the medication better during the day.  She is on apixaban at 5 mg b.i.d.      Echocardiogram was completed 03/08 which showed a normal EF of 55%-60%.  RV function was normal and no valvular insufficiency was appreciated.        All other review  of systems and past medical history are noted below.      ASSESSMENT AND PLAN:  Ms. Joseph is a delightful 70-year-old woman here today for followup.   1.  Paroxysmal atrial fibrillation with rapid ventricular response.    The burden was low, less than 1% on Zio patch.  Rates were quite rapid though.  She is doing well on diltiazem 180 mg daily.  She still has some mild numbness and tingling symptoms, but feels that she is starting to get used to it.  I did ask that she take the medication during the day because she felt it would be helpful.  If she does not tolerate diltiazem, we could go back to metoprolol.     2.  Her CHADS-VASc score is 2 (age and gender).    She is on apixaban 5 mg twice daily and tolerating.     3. Sleep disorder  She was referred to Sleep Medicine and she was seen by video visit with Dr. Vang.  He did recommend a sleep study.  She states she has not heard anything from their scheduling department to get this arranged.  My nurse will contact the [facility] to get this arranged.  Dr. Vuong puts in his note that he prefers rate control for now.  The plan is to try to see if we can control the RVR with either diltiazem or reinitiation of the beta blocker.  We could also consider initiation of flecainide and/or catheter ablation in the future.  The patient does have an older Apple watch.  She is interested in knowing what her low and fast heart rates are.  She states since starting diltiazem and/or metoprolol, she has not had any fast heart rates.  Her slowest heart rate on diltiazem has been about 50 beats per minute.     4.  White coat syndrome.    Her blood pressure was initially elevated, which I noted at her visit with Dr. Vuong as well.  I did reassess her pressure at the end of the visit at 132/80.  She checks her blood pressure at home and her blood pressure range has been 118-130/80.   5.  Mild anxiety disorder.    She is meeting with her primary and is willing to consider  medication for this.  I have encouraged her to do so.     6.  Barretts esophagus.    She is back on omeprazole.  She gets sometimes an epigastric discomfort/fluttering.  She can never tell if it is her reflux or her arrhythmia.  Again, having a better monitor, such as a new iWatch and/or a Kardia monitor would help her differentiate this.  I also think it would help to minimize her anxiety.      I would like to see her back in 4-6 weeks to see how she is doing on her medication.  She will contact us sooner if she has questions or concerns.         JOSELITO PENNINGTON NP             D: 2021   T: 2021   MT: PAULINO      Name:     DOMINGUEZ JULIAN   MRN:      [id number]        Account:      [id number]   :      1950           Service Date: 2021      Document: [id number]

## 2021-03-17 NOTE — LETTER
3/17/2021    Tara Ann Nelson, MD [facility] [address] [facility] [address]    RE: Sheri Joseph       Dear Colleague,    I had the pleasure of seeing Sheri Joseph in the [facility].    HPI and Plan: #[id number]  See dictation    Orders Placed This Encounter   Procedures     Follow-Up with Cardiac Advanced Practice Provider       Orders Placed This Encounter   Medications     omeprazole (PRILOSEC) 20 MG DR capsule     Sig: Take 1 capsule (20 mg) by mouth daily     Dispense:          There are no discontinued medications.      Encounter Diagnoses   Name Primary?     Paroxysmal atrial fibrillation (H) Yes     Palpitations      Herbert's esophagus without dysplasia        CURRENT MEDICATIONS:  Current Outpatient Medications   Medication Sig Dispense Refill     apixaban ANTICOAGULANT (ELIQUIS ANTICOAGULANT) 5 MG tablet Take 1 tablet (5 mg) by mouth 2 times daily 60 tablet 11     diltiazem ER COATED BEADS (CARDIZEM CD/CARTIA XT) 180 MG 24 hr capsule Take 1 capsule (180 mg) by mouth daily 30 capsule 3     omeprazole (PRILOSEC) 20 MG DR capsule Take 1 capsule (20 mg) by mouth daily         ALLERGIES     Allergies   Allergen Reactions     Penicillins        PAST MEDICAL HISTORY:  Past Medical History:   Diagnosis Date     Paroxysmal A-fib (H)        PAST SURGICAL HISTORY:  History reviewed. No pertinent surgical history.    FAMILY HISTORY:  History reviewed. No pertinent family history.    SOCIAL HISTORY:  Social History     Socioeconomic History     Marital status:      Spouse name: None     Number of children: None     Years of education: None     Highest education level: None   Occupational History     None   Social Needs     Financial resource strain: None     Food insecurity     Worry: None     Inability: None     Transportation needs     Medical: None     Non-medical: None   Tobacco Use     Smoking status: Never Smoker      "Smokeless tobacco: Never Used   Substance and Sexual Activity     Alcohol use: Not Currently     Drug use: None     Sexual activity: None   Lifestyle     Physical activity     Days per week: None     Minutes per session: None     Stress: None   Relationships     Social connections     Talks on phone: None     Gets together: None     Attends Church service: None     Active member of club or organization: None     Attends meetings of clubs or organizations: None     Relationship status: None     Intimate partner violence     Fear of current or ex partner: None     Emotionally abused: None     Physically abused: None     Forced sexual activity: None   Other Topics Concern     Parent/sibling w/ CABG, MI or angioplasty before 65F 55M? Not Asked   Social History Narrative     None       Review of Systems:  Skin:  Negative       Eyes:  Positive for glasses    ENT:  Negative      Respiratory:  Negative       Cardiovascular:    palpitations;Positive for;lightheadedness    Gastroenterology: Negative      Genitourinary:  Negative      Musculoskeletal:  Negative      Neurologic:  Negative      Psychiatric:  Positive for anxiety    Heme/Lymph/Imm:  Negative      Endocrine:  Negative        Physical Exam:  Vitals: /80   Pulse 90   Ht 1.626 m (5' 4\")   Wt 85.7 kg (189 lb)   BMI 32.44 kg/m      Constitutional:  cooperative, alert and oriented, well developed, well nourished, in no acute distress        Skin:  warm and dry to the touch;no apparent skin lesions or masses noted          Head:  normocephalic, no masses or lesions        Eyes:  pupils equal and round        Lymph:      ENT:  no pallor or cyanosis, dentition good        Neck:  JVP normal        Respiratory:  normal breath sounds, clear to auscultation, normal A-P diameter, normal symmetry, normal respiratory excursion, no use of accessory muscles         Cardiac: regular rhythm;normal S1 and S2;no murmurs, gallops or rubs detected                not assessed " this visit                                        GI:  not assessed this visit        Extremities and Muscular Skeletal:  no deformities, clubbing, cyanosis, erythema observed;no edema              Neurological:  no gross motor deficits        Psych:  Alert and Oriented x 3 Anxious    Thank you for allowing me to participate in the care of your patient.      Sincerely,     Sarah Boyd NP, APRN [facility]    cc:   Tony Vuong MD  [address]  [place] [id number]

## 2021-03-17 NOTE — PATIENT INSTRUCTIONS
Call my nurse with any questions or concerns:  [phone]  *If you have concerns after hours, please call [phone], option 2 to speak with on call Cardiologist.

## 2021-03-17 NOTE — TELEPHONE ENCOUNTER
3/17/21 Pt here for OV with Sarah NICOLAS. Pt stated she has not had her sleep study completed yet. Stated she had the initial consult and thought clinic would reach out to her to set up Sleep Study.Sarah asked if I would follow up for pt.  I called Dr Vang ([phone]) and left a VM for his nurse with above information and requested callback or to call pt  Chon 4 pm

## 2021-03-17 NOTE — PROGRESS NOTES
HPI and Plan: #[id number]  See dictation    Orders Placed This Encounter   Procedures     Follow-Up with Cardiac Advanced Practice Provider       Orders Placed This Encounter   Medications     omeprazole (PRILOSEC) 20 MG DR capsule     Sig: Take 1 capsule (20 mg) by mouth daily     Dispense:          There are no discontinued medications.      Encounter Diagnoses   Name Primary?     Paroxysmal atrial fibrillation (H) Yes     Palpitations      Herbert's esophagus without dysplasia        CURRENT MEDICATIONS:  Current Outpatient Medications   Medication Sig Dispense Refill     apixaban ANTICOAGULANT (ELIQUIS ANTICOAGULANT) 5 MG tablet Take 1 tablet (5 mg) by mouth 2 times daily 60 tablet 11     diltiazem ER COATED BEADS (CARDIZEM CD/CARTIA XT) 180 MG 24 hr capsule Take 1 capsule (180 mg) by mouth daily 30 capsule 3     omeprazole (PRILOSEC) 20 MG DR capsule Take 1 capsule (20 mg) by mouth daily         ALLERGIES     Allergies   Allergen Reactions     Penicillins        PAST MEDICAL HISTORY:  Past Medical History:   Diagnosis Date     Paroxysmal A-fib (H)        PAST SURGICAL HISTORY:  History reviewed. No pertinent surgical history.    FAMILY HISTORY:  History reviewed. No pertinent family history.    SOCIAL HISTORY:  Social History     Socioeconomic History     Marital status:      Spouse name: None     Number of children: None     Years of education: None     Highest education level: None   Occupational History     None   Social Needs     Financial resource strain: None     Food insecurity     Worry: None     Inability: None     Transportation needs     Medical: None     Non-medical: None   Tobacco Use     Smoking status: Never Smoker     Smokeless tobacco: Never Used   Substance and Sexual Activity     Alcohol use: Not Currently     Drug use: None     Sexual activity: None   Lifestyle     Physical activity     Days per week: None     Minutes per session: None     Stress: None   Relationships     Social  "connections     Talks on phone: None     Gets together: None     Attends Gnosticist service: None     Active member of club or organization: None     Attends meetings of clubs or organizations: None     Relationship status: None     Intimate partner violence     Fear of current or ex partner: None     Emotionally abused: None     Physically abused: None     Forced sexual activity: None   Other Topics Concern     Parent/sibling w/ CABG, MI or angioplasty before 65F 55M? Not Asked   Social History Narrative     None       Review of Systems:  Skin:  Negative       Eyes:  Positive for glasses    ENT:  Negative      Respiratory:  Negative       Cardiovascular:    palpitations;Positive for;lightheadedness    Gastroenterology: Negative      Genitourinary:  Negative      Musculoskeletal:  Negative      Neurologic:  Negative      Psychiatric:  Positive for anxiety    Heme/Lymph/Imm:  Negative      Endocrine:  Negative        Physical Exam:  Vitals: /80   Pulse 90   Ht 1.626 m (5' 4\")   Wt 85.7 kg (189 lb)   BMI 32.44 kg/m      Constitutional:  cooperative, alert and oriented, well developed, well nourished, in no acute distress        Skin:  warm and dry to the touch;no apparent skin lesions or masses noted          Head:  normocephalic, no masses or lesions        Eyes:  pupils equal and round        Lymph:      ENT:  no pallor or cyanosis, dentition good        Neck:  JVP normal        Respiratory:  normal breath sounds, clear to auscultation, normal A-P diameter, normal symmetry, normal respiratory excursion, no use of accessory muscles         Cardiac: regular rhythm;normal S1 and S2;no murmurs, gallops or rubs detected                not assessed this visit                                        GI:  not assessed this visit        Extremities and Muscular Skeletal:  no deformities, clubbing, cyanosis, erythema observed;no edema              Neurological:  no gross motor deficits        Psych:  Alert and " Oriented x 3 Anxious      CC  Tony Vuong MD  [address]  [place] [id number]

## 2021-03-18 NOTE — TELEPHONE ENCOUNTER
03/18/21 Spoke with [facility] scheduling who stated they will reach out to pt and schedule sleep study.  Chon 938 am

## 2021-03-28 ENCOUNTER — HEALTH MAINTENANCE LETTER (OUTPATIENT)
Age: 71
End: 2021-03-28

## 2021-04-05 ENCOUNTER — TELEPHONE (OUTPATIENT)
Dept: SLEEP MEDICINE | Facility: CLINIC | Age: 71
End: 2021-04-05

## 2021-04-05 NOTE — TELEPHONE ENCOUNTER
Reason for call:  Other   Patient called regarding (reason for call): appointment  Additional comments:   Sleep Study needs scheduling    Phone number to reach patient:  Cell number on file:    Telephone Information:   Mobile [phone]       Best Time:  any    Can we leave a detailed message on this number?  YES    Travel screening: Not Applicable

## 2021-04-15 ENCOUNTER — TELEPHONE (OUTPATIENT)
Dept: CARDIOLOGY | Facility: CLINIC | Age: 71
End: 2021-04-15

## 2021-04-15 NOTE — TELEPHONE ENCOUNTER
4/15/21 Sent pt John msg reminding her to reach out to Sleep Center to schedule her sleep study in preparation for EP OV on 4/21.  Chon 1145 am

## 2021-04-21 ENCOUNTER — OFFICE VISIT (OUTPATIENT)
Dept: CARDIOLOGY | Facility: CLINIC | Age: 71
End: 2021-04-21
Payer: COMMERCIAL

## 2021-04-21 VITALS
DIASTOLIC BLOOD PRESSURE: 87 MMHG | WEIGHT: 188 LBS | HEART RATE: 89 BPM | BODY MASS INDEX: 32.1 KG/M2 | HEIGHT: 64 IN | SYSTOLIC BLOOD PRESSURE: 129 MMHG

## 2021-04-21 DIAGNOSIS — I48.0 PAROXYSMAL ATRIAL FIBRILLATION (H): Primary | ICD-10-CM

## 2021-04-21 DIAGNOSIS — R00.2 PALPITATIONS: ICD-10-CM

## 2021-04-21 PROCEDURE — 93000 ELECTROCARDIOGRAM COMPLETE: CPT | Performed by: NURSE PRACTITIONER

## 2021-04-21 PROCEDURE — 99213 OFFICE O/P EST LOW 20 MIN: CPT | Performed by: NURSE PRACTITIONER

## 2021-04-21 RX ORDER — SERTRALINE HYDROCHLORIDE 25 MG/1
25 TABLET, FILM COATED ORAL DAILY
COMMUNITY

## 2021-04-21 ASSESSMENT — MIFFLIN-ST. JEOR: SCORE: 1352.76

## 2021-04-21 NOTE — PATIENT INSTRUCTIONS
Call my nurse with any questions or concerns:  [phone]  *If you have concerns after hours, please call [phone], option 2 to speak with on call Cardiologist.    Call with any concerns

## 2021-04-21 NOTE — PROGRESS NOTES
HPI and Plan: #[id number]  See dictation    Orders Placed This Encounter   Procedures     Follow-Up with Electrophysiologist     EKG 12-lead complete w/read - Clinics (future- to be scheduled)     EKG 12-lead complete w/read - Clinics (to be scheduled)       Orders Placed This Encounter   Medications     sertraline (ZOLOFT) 25 MG tablet     Sig: Take 25 mg by mouth daily       There are no discontinued medications.      Encounter Diagnosis   Name Primary?     Paroxysmal atrial fibrillation (H) Yes       CURRENT MEDICATIONS:  Current Outpatient Medications   Medication Sig Dispense Refill     sertraline (ZOLOFT) 25 MG tablet Take 25 mg by mouth daily       apixaban ANTICOAGULANT (ELIQUIS ANTICOAGULANT) 5 MG tablet Take 1 tablet (5 mg) by mouth 2 times daily 60 tablet 11     diltiazem ER COATED BEADS (CARDIZEM CD/CARTIA XT) 180 MG 24 hr capsule Take 1 capsule (180 mg) by mouth daily 30 capsule 3     omeprazole (PRILOSEC) 20 MG DR capsule Take 1 capsule (20 mg) by mouth daily         ALLERGIES     Allergies   Allergen Reactions     Penicillins        PAST MEDICAL HISTORY:  Past Medical History:   Diagnosis Date     Paroxysmal A-fib (H)        PAST SURGICAL HISTORY:  No past surgical history on file.    FAMILY HISTORY:  No family history on file.    SOCIAL HISTORY:  Social History     Socioeconomic History     Marital status:      Spouse name: Not on file     Number of children: Not on file     Years of education: Not on file     Highest education level: Not on file   Occupational History     Not on file   Social Needs     Financial resource strain: Not on file     Food insecurity     Worry: Not on file     Inability: Not on file     Transportation needs     Medical: Not on file     Non-medical: Not on file   Tobacco Use     Smoking status: Never Smoker     Smokeless tobacco: Never Used   Substance and Sexual Activity     Alcohol use: Not Currently     Drug use: Not on file     Sexual activity: Not on file  "  Lifestyle     Physical activity     Days per week: Not on file     Minutes per session: Not on file     Stress: Not on file   Relationships     Social connections     Talks on phone: Not on file     Gets together: Not on file     Attends Restorationism service: Not on file     Active member of club or organization: Not on file     Attends meetings of clubs or organizations: Not on file     Relationship status: Not on file     Intimate partner violence     Fear of current or ex partner: Not on file     Emotionally abused: Not on file     Physically abused: Not on file     Forced sexual activity: Not on file   Other Topics Concern     Parent/sibling w/ CABG, MI or angioplasty before 65F 55M? Not Asked   Social History Narrative     Not on file       Review of Systems:  Skin:  Negative       Eyes:  Positive for glasses    ENT:  Negative      Respiratory:  Negative       Cardiovascular:    palpitations;Positive for;lightheadedness    Gastroenterology: Negative      Genitourinary:  Negative      Musculoskeletal:  Negative      Neurologic:  Negative      Psychiatric:  Positive for anxiety    Heme/Lymph/Imm:  Negative      Endocrine:  Negative        Physical Exam:  Vitals: /87   Pulse 89   Ht 1.626 m (5' 4\")   Wt 85.3 kg (188 lb)   BMI 32.27 kg/m      Constitutional:  cooperative, alert and oriented, well developed, well nourished, in no acute distress        Skin:  warm and dry to the touch;no apparent skin lesions or masses noted          Head:  normocephalic, no masses or lesions        Eyes:  pupils equal and round        Lymph:      ENT:  no pallor or cyanosis, dentition good        Neck:  JVP normal        Respiratory:  normal breath sounds, clear to auscultation, normal A-P diameter, normal symmetry, normal respiratory excursion, no use of accessory muscles         Cardiac: regular rhythm;normal S1 and S2;no murmurs, gallops or rubs detected                not assessed this visit                              "           GI:  not assessed this visit        Extremities and Muscular Skeletal:  no deformities, clubbing, cyanosis, erythema observed;no edema              Neurological:  no gross motor deficits        Psych:  Alert and Oriented x 3        CC  Sarah Boyd, ALIRIO CNP  [address]  [place]  [place] [address]

## 2021-04-21 NOTE — LETTER
4/21/2021    [facility] [address] [facility] [address]    RE: Sheri Joseph       Dear Colleague,    I had the pleasure of seeing Sheri Joseph in the [facility].    HPI and Plan: #[id number]  See dictation    Orders Placed This Encounter   Procedures     Follow-Up with Electrophysiologist     EKG 12-lead complete w/read - Clinics (future- to be scheduled)     EKG 12-lead complete w/read - Clinics (to be scheduled)       Orders Placed This Encounter   Medications     sertraline (ZOLOFT) 25 MG tablet     Sig: Take 25 mg by mouth daily       There are no discontinued medications.      Encounter Diagnosis   Name Primary?     Paroxysmal atrial fibrillation (H) Yes       CURRENT MEDICATIONS:  Current Outpatient Medications   Medication Sig Dispense Refill     sertraline (ZOLOFT) 25 MG tablet Take 25 mg by mouth daily       apixaban ANTICOAGULANT (ELIQUIS ANTICOAGULANT) 5 MG tablet Take 1 tablet (5 mg) by mouth 2 times daily 60 tablet 11     diltiazem ER COATED BEADS (CARDIZEM CD/CARTIA XT) 180 MG 24 hr capsule Take 1 capsule (180 mg) by mouth daily 30 capsule 3     omeprazole (PRILOSEC) 20 MG DR capsule Take 1 capsule (20 mg) by mouth daily         ALLERGIES     Allergies   Allergen Reactions     Penicillins        PAST MEDICAL HISTORY:  Past Medical History:   Diagnosis Date     Paroxysmal A-fib (H)        PAST SURGICAL HISTORY:  No past surgical history on file.    FAMILY HISTORY:  No family history on file.    SOCIAL HISTORY:  Social History     Socioeconomic History     Marital status:      Spouse name: Not on file     Number of children: Not on file     Years of education: Not on file     Highest education level: Not on file   Occupational History     Not on file   Social Needs     Financial resource strain: Not on file     Food insecurity     Worry: Not on file     Inability: Not on file      "Transportation needs     Medical: Not on file     Non-medical: Not on file   Tobacco Use     Smoking status: Never Smoker     Smokeless tobacco: Never Used   Substance and Sexual Activity     Alcohol use: Not Currently     Drug use: Not on file     Sexual activity: Not on file   Lifestyle     Physical activity     Days per week: Not on file     Minutes per session: Not on file     Stress: Not on file   Relationships     Social connections     Talks on phone: Not on file     Gets together: Not on file     Attends Episcopal service: Not on file     Active member of club or organization: Not on file     Attends meetings of clubs or organizations: Not on file     Relationship status: Not on file     Intimate partner violence     Fear of current or ex partner: Not on file     Emotionally abused: Not on file     Physically abused: Not on file     Forced sexual activity: Not on file   Other Topics Concern     Parent/sibling w/ CABG, MI or angioplasty before 65F 55M? Not Asked   Social History Narrative     Not on file       Review of Systems:  Skin:  Negative       Eyes:  Positive for glasses    ENT:  Negative      Respiratory:  Negative       Cardiovascular:    palpitations;Positive for;lightheadedness    Gastroenterology: Negative      Genitourinary:  Negative      Musculoskeletal:  Negative      Neurologic:  Negative      Psychiatric:  Positive for anxiety    Heme/Lymph/Imm:  Negative      Endocrine:  Negative        Physical Exam:  Vitals: /87   Pulse 89   Ht 1.626 m (5' 4\")   Wt 85.3 kg (188 lb)   BMI 32.27 kg/m      Constitutional:  cooperative, alert and oriented, well developed, well nourished, in no acute distress        Skin:  warm and dry to the touch;no apparent skin lesions or masses noted          Head:  normocephalic, no masses or lesions        Eyes:  pupils equal and round        Lymph:      ENT:  no pallor or cyanosis, dentition good        Neck:  JVP normal        Respiratory:  normal breath " sounds, clear to auscultation, normal A-P diameter, normal symmetry, normal respiratory excursion, no use of accessory muscles         Cardiac: regular rhythm;normal S1 and S2;no murmurs, gallops or rubs detected                not assessed this visit                                        GI:  not assessed this visit        Extremities and Muscular Skeletal:  no deformities, clubbing, cyanosis, erythema observed;no edema              Neurological:  no gross motor deficits        Psych:  Alert and Oriented x 3      Thank you for allowing me to participate in the care of your patient.      Sincerely,     Sarah Boyd, EMELY, APRN CNP     [facility]    cc:   ALIRIO Ramirez CNP  [address]  [place] [address]

## 2021-04-22 NOTE — PROGRESS NOTES
Service Date: 04/21/2021    HISTORY OF PRESENT ILLNESS:   Ms. Joseph is a 70-year-old woman date of having the pleasure of seeing today in followup.  She was seen in consultation by Dr. Vuong is 02/18/2021.    She developed her high heart rates and low heart rates both noted on her Apple watch over a year ago.  Her primary care doctor did order a 14-day Zio patch, which showed episodes of paroxysmal atrial fibrillation with RVR.  Rates ranged as fast as 170-200 beats per minute, longest episode was 25 minutes.  Aurora was less than 1%.  Slowest heart rates were after 10:00 p.m. down to about 41 beats per minute.    She does have a history of anxiety, which is mild, but currently treated with sertraline.  She also has a history of Herbert's esophagus, on omeprazole.    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    Currently, she denies chest pain, shortness of breath, lightheadedness or dizziness.  She is scheduled to undergo a formal sleep study next month.    Blood pressure today is stable at 129/87.  A 12-lead EKG shows sinus rhythm at 82 beats per minute.    The patient reports that she has had some brief episodes of irregular heart rate.  She showed a few tracings on her Apple watch, but both were sinus rhythm.  She is on Eliquis at 5 mg twice daily and tolerating the medication without concern.    REVIEW OF SYSTEMS:  All other review of systems and past medical history noted below.     ASSESSMENT AND PLAN:   Ms. Joseph is a delightful 70-year-old woman here today for followup.  1.  Symptomatic paroxysmal atrial fibrillation with rapid ventricular rate.  Again, burden was less than 1% on Zio patch.  Rates were quite rapid.  Dr. Vuong initially started on metoprolol, which the patient did not tolerate.  She is on diltiazem now, 180 mg daily.  She changed her diltiazem to first thing in the morning and states that she feels that it has been more beneficial.    Her CHADS-VASc score is  2 (age and gender).  She is on apixaban 5 mg twice daily.    Echocardiogram last month showed an EF of 55-60% with normal RV function.  No valvular insufficiencies were appreciated.    2.  Mild history of anxiety disorder.  The patient is on low dose sertraline and feels that it has been very beneficial for her.    No medication changes were made today.  She will contact us if she has any questions or concerns.  She has a new iWatch.  She is able to e-mail herself the transmissions.  I did say that if episodes of persistent tachycardia or any other concerns, she should let us know.    I am happy to see that she will be having her sleep study next month.  She will follow up with Dr. Vuong in 1 year with a 12-lead EKG.    Sarah Boyd NP        D: 2021   T: 2021   MT: [id number]    Name:     DOMINGUEZ JULIAN  MRN:      [id number]        Account:      [id number]   :      1950           Service Date: 2021       Document: [id number]

## 2021-05-06 ENCOUNTER — THERAPY VISIT (OUTPATIENT)
Dept: SLEEP MEDICINE | Facility: CLINIC | Age: 71
End: 2021-05-06
Payer: COMMERCIAL

## 2021-05-06 DIAGNOSIS — R29.818 SUSPECTED SLEEP APNEA: ICD-10-CM

## 2021-05-06 DIAGNOSIS — R06.83 SNORING: ICD-10-CM

## 2021-05-06 DIAGNOSIS — I48.0 PAROXYSMAL ATRIAL FIBRILLATION (H): ICD-10-CM

## 2021-05-06 PROCEDURE — 95810 POLYSOM 6/> YRS 4/> PARAM: CPT | Performed by: INTERNAL MEDICINE

## 2021-05-08 NOTE — PROCEDURES
" SLEEP STUDY INTERPRETATION  DIAGNOSTIC POLYSOMNOGRAPHY REPORT      Patient: DOMINGUEZ JULIAN  YOB: 1950  Study Date: 06/05/2021  MRN: [id number]  Referring Provider: MD Vuong Demosthenes  Ordering Provider: MD Pickens Rakesh    Indications for Polysomnography: The patient is a 70 year old Female who is 5' 4\" and weighs 186.0 lbs. Her BMI is 32.1, Sunset sleepiness scale 5 and neck circumference is - cm. Relevant medical history includes paroxysmal atrial fibrillation. A diagnostic polysomnogram was performed to evaluate for sleep apnea.    Polysomnogram Data: A full night polysomnogram recorded the standard physiologic parameters including EEG, EOG, EMG, ECG, nasal and oral airflow. Respiratory parameters of chest and abdominal movements were recorded with respiratory inductance plethysmography. Oxygen saturation was recorded by pulse oximetry. Hypopnea scoring rule used: 1B 4%.    Sleep Architecture: Fragmented sleep with long sleep latency and reduced sleep efficiency.   The total recording time of the polysomnogram was 466.3 minutes. The total sleep time was 298.0 minutes. Sleep latency was increased at 52.4 minutes without the use of a sleep aid. REM latency was 243.0 minutes. Arousal index was increased at 37.4 arousals per hour. Sleep efficiency was decreased at 63.9%. Wake after sleep onset was 111.5 minutes. The patient spent 5.5% of total sleep time in Stage N1, 62.9% in Stage N2, 14.9% in Stage N3, and 16.6% in REM. Time in REM supine was - minutes.    Respiration: Mild obstructive sleep apnea, predominantly REM related.     Events ? The polysomnogram revealed a presence of 10 obstructive, - central, and - mixed apneas resulting in an apnea index of 2.0 events per hour. There were 31 obstructive hypopneas and - central hypopneas resulting in an obstructive hypopnea index of 6.2 and central hypopnea index of - events per hour. The combined apnea/hypopnea index was 8.3 events per hour (central " apnea/hypopnea index was - events per hour). The REM AHI was 27.9 events per hour. The supine AHI was - events per hour. The RERA index was 3.6 events per hour.  The RDI was 11.9 events per hour.    Snoring - was reported as moderate to loud.    Respiratory rate and pattern - was notable for normal respiratory rate and pattern.    Sustained Sleep Associated Hypoventilation - Transcutaneous carbon dioxide monitoring was not used; however significant hypoventilation was not suggested by oximetry.    Sleep Associated Hypoxemia - (Greater than 5 minutes O2 sat at or below 88%) was not present. Baseline oxygen saturation was 94.1%. Lowest oxygen saturation was 85.5%. Time spent less than or equal to 88% was 1.5 minutes. Time spent less than or equal to 89% was 3.2 minutes.    Movement Activity: Negative for significant movement abnormalities.     Periodic Limb Activity - There were 75 PLMs during the entire study. The PLM index was 15.1 movements per hour. The PLM Arousal Index was 3.4 per hour.    REM EMG Activity - Excessive transient/sustained muscle activity was not present.    Nocturnal Behavior - Abnormal sleep related behaviors were not noted during/arising out of NREM / REM sleep.     Bruxism - None apparent.    Cardiac Summary: Sinus rhythm with occasional PVCs.   The average pulse rate was 51.4 bpm. The minimum pulse rate was 41.0 bpm while the maximum pulse rate was 80.0 bpm.  Arrhythmias were noted.    Assessment:     This sleep study shows a mild degree of obstructive sleep apnea with sleep apnea events predominantly occurring in REM sleep.     Sleep was fragmented, sleep latency was long and sleep efficiency was reduced. Of note, there was no supine sleep.     Recommendations:    Depending on clinical indications for treatment of mild sleep apnea, consider following options.    Patient may be a candidate for dental appliance through referral to Sleep Dentistry for the treatment of obstructive sleep  apnea.    CPAP therapy can be considered if there is excessive daytime sleepiness or another qualifying medical comorbidity. Treatment could be empirically initiated with Auto?titrating PAP therapy with a range of 5 to 15 cmH2O. Recommend clinical follow up with sleep management team.    Weight management.     Diagnostic Codes:   Obstructive Sleep Apnea G47.33  Repetitive Intrusions Into Sleep F51.8    06/05/2021 [place] [facility] Study (186.0 lbs) - AHI 8.3, RDI 11.9, Supine AHI -, REM AHI 27.9, Low O2 85.5%, Time Spent ?88% 1.5 minutes / Time Spent ?89% 3.2 minutes.     _____________________________________   Electronically Signed By: Taj Pickens MD 05/08/2021

## 2021-05-10 LAB — SLPCOMP: NORMAL

## 2021-05-20 ENCOUNTER — VIRTUAL VISIT (OUTPATIENT)
Dept: SLEEP MEDICINE | Facility: CLINIC | Age: 71
End: 2021-05-20
Payer: COMMERCIAL

## 2021-05-20 VITALS — HEIGHT: 64 IN | BODY MASS INDEX: 32.1 KG/M2 | WEIGHT: 188 LBS

## 2021-05-20 DIAGNOSIS — G47.33 OSA (OBSTRUCTIVE SLEEP APNEA): Primary | ICD-10-CM

## 2021-05-20 PROCEDURE — 99213 OFFICE O/P EST LOW 20 MIN: CPT | Mod: 95 | Performed by: INTERNAL MEDICINE

## 2021-05-20 ASSESSMENT — MIFFLIN-ST. JEOR: SCORE: 1352.76

## 2021-05-20 NOTE — PROGRESS NOTES
Sheri is a 70 year old who is being evaluated via a billable video visit.      Video Start Time: 9:58 AM  Video-Visit Details    Type of service:  Video Visit    Video End Time:10:13 AM    Originating Location (pt. Location): Home    Distant Location (provider location):  [facility] [place]     Platform used for Video Visit: [facility]      Sleep Study Follow-Up Visit:    Date on this visit: 5/20/2021    Sheri Joseph comes in today for follow-up of her sleep study done on 5/6/21 at the [facility] for possible sleep apnea.    Sleep latency 52.4 minutes without Ambien.  REM achieved.   REM latency 243 minutes.  Sleep efficiency 63.9%. Total sleep time 298 minutes.    Sleep architecture:  Stage 1, 5.5% (5%), stage 2, 62.9% (45-55%), stage 3, 14.9% (15-20%), stage REM, 16.6% (20-25%).  AHI was 8.3, without significant desaturations. RDI 11.9.  REM AHI 27.9, consistent with moderate REM JOSE ELIAS.  Supine AHI -, no supine sleep.  Periodic Limb Movement Index 15/hour.       These findings were reviewed with patient.     Past medical/surgical history, family history, social history, medications and allergies were reviewed.      Problem List:  There are no active problems to display for this patient.       Impression/Plan:    1. Mild Obstructive sleep apnea   2. Paroxysmal Atrial fibrillation     PSG result was reviewed in detail. Patient has mild sleep apnea without significant hypoxemia. There was no supine sleep and she confirms that she normally doesnot sleep supine.     Treatment is optional for mild sleep apnea. Patient denies significant subjective symptoms related to mild sleep apnea. We reviewed oral appliance, CPAP and no treatment. Patient opts for oral appliance therapy.     Plan:     1. Referral to dental sleep medicine for mild sleep apnea    I spent a total of 25 minutes for this appointment today which include time spent before, during and after the visit for patient care,  counseling and coordination of care.      Dr. Taj Pickens     CC: Roberta Thayer

## 2021-05-20 NOTE — PATIENT INSTRUCTIONS
Your BMI is Body mass index is 32.27 kg/m .  Weight management is a personal decision.  If you are interested in exploring weight loss strategies, the following discussion covers the approaches that may be successful. Body mass index (BMI) is one way to tell whether you are at a healthy weight, overweight, or obese. It measures your weight in relation to your height.  A BMI of 18.5 to 24.9 is in the healthy range. A person with a BMI of 25 to 29.9 is considered overweight, and someone with a BMI of 30 or greater is considered obese. More than two-thirds of American adults are considered overweight or obese.  Being overweight or obese increases the risk for further weight gain. Excess weight may lead to heart disease and diabetes.  Creating and following plans for healthy eating and physical activity may help you improve your health.  Weight control is part of healthy lifestyle and includes exercise, emotional health, and healthy eating habits. Careful eating habits lifelong are the mainstay of weight control. Though there are significant health benefits from weight loss, long-term weight loss with diet alone may be very difficult to achieve- studies show long-term success with dietary management in less than 10% of people. Attaining a healthy weight may be especially difficult to achieve in those with severe obesity. In some cases, medications, devices and surgical management might be considered.  What can you do?  If you are overweight or obese and are interested in methods for weight loss, you should discuss this with your provider.     Consider reducing daily calorie intake by 500 calories.     Keep a food journal.     Avoiding skipping meals, consider cutting portions instead.    Diet combined with exercise helps maintain muscle while optimizing fat loss. Strength training is particularly important for building and maintaining muscle mass. Exercise helps reduce stress, increase energy, and improves fitness.  Increasing exercise without diet control, however, may not burn enough calories to loose weight.       Start walking three days a week 10-20 minutes at a time    Work towards walking thirty minutes five days a week     Eventually, increase the speed of your walking for 1-2 minutes at time    In addition, we recommend that you review healthy lifestyles and methods for weight loss available through the National [employer] of Health patient information sites:  http://win.niddk.nih.gov/publications/index.htm    And look into health and wellness programs that may be available through your health insurance provider, employer, local community center, or gisele club.    Weight management plan: Patient was referred to their PCP to discuss a diet and exercise plan.

## 2021-06-21 DIAGNOSIS — I48.0 PAROXYSMAL ATRIAL FIBRILLATION (H): ICD-10-CM

## 2021-06-21 RX ORDER — DILTIAZEM HYDROCHLORIDE 180 MG/1
180 CAPSULE, COATED, EXTENDED RELEASE ORAL DAILY
Qty: 90 CAPSULE | Refills: 3 | Status: SHIPPED | OUTPATIENT
Start: 2021-06-21 | End: 2022-06-20

## 2021-07-12 ENCOUNTER — MEDICAL CORRESPONDENCE (OUTPATIENT)
Dept: HEALTH INFORMATION MANAGEMENT | Facility: CLINIC | Age: 71
End: 2021-07-12

## 2021-07-12 ENCOUNTER — TELEPHONE (OUTPATIENT)
Dept: SLEEP MEDICINE | Facility: CLINIC | Age: 71
End: 2021-07-12

## 2021-07-12 NOTE — TELEPHONE ENCOUNTER
Reason for call:  Other   Patient called regarding (reason for call): call back    Additional comments:   [facility] called for the patient's Sleep Study for a sleep appliance.  Please fax to [phone] fax#.    Phone number to reach patient:  Other phone number:  [phone] # [facility]    Best Time:  any    Can we leave a detailed message on this number?  YES    Travel screening: Not Applicable

## 2021-07-16 ENCOUNTER — TELEPHONE (OUTPATIENT)
Dept: SLEEP MEDICINE | Facility: CLINIC | Age: 71
End: 2021-07-16
Payer: COMMERCIAL

## 2021-07-16 NOTE — TELEPHONE ENCOUNTER
[facility] notes rev'd and scanned into chart.      Message sent to provider for review.    FANNIE Lion

## 2021-07-29 ENCOUNTER — TELEPHONE (OUTPATIENT)
Dept: SLEEP MEDICINE | Facility: CLINIC | Age: 71
End: 2021-07-29

## 2021-07-29 NOTE — TELEPHONE ENCOUNTER
Orders have been received from [facility] and given to [facility] to review and sign.    Orders have been signed, faxed back and scanned in.    FANNIE Lion

## 2021-08-12 ENCOUNTER — TELEPHONE (OUTPATIENT)
Dept: CARDIOLOGY | Facility: CLINIC | Age: 71
End: 2021-08-12

## 2021-08-12 NOTE — TELEPHONE ENCOUNTER
Pt called and states that for the last 3-4 days she has had A Fib and chills. Wondering if chills is normal of A Fib. Explained that this is not something that this writer has experienced with other patients.  Pt states that she has no fever or sob.  Pt did state that her apple watch did detect A fib.  Pt states that it was jagged.  Pt will send in the strips via [employer] to review. Pt has history of flutter in the past also.  Will await strips. Som

## 2021-09-11 ENCOUNTER — HEALTH MAINTENANCE LETTER (OUTPATIENT)
Age: 71
End: 2021-09-11

## 2021-09-30 ENCOUNTER — CARE COORDINATION (OUTPATIENT)
Dept: SLEEP MEDICINE | Facility: CLINIC | Age: 71
End: 2021-09-30

## 2021-09-30 NOTE — PROGRESS NOTES
Faxed signed Detailed Written Order for Oral Appliance Therapy to [facility] 8[facility]12/2021  Fax:  [phone].  DWO sent to [facility] to be scanned into epic

## 2021-11-29 ENCOUNTER — MYC MEDICAL ADVICE (OUTPATIENT)
Dept: CARDIOLOGY | Facility: CLINIC | Age: 71
End: 2021-11-29
Payer: COMMERCIAL

## 2021-11-29 DIAGNOSIS — I49.3 PVC'S (PREMATURE VENTRICULAR CONTRACTIONS): Primary | ICD-10-CM

## 2021-11-29 NOTE — TELEPHONE ENCOUNTER
11/29/21 Spoke to pt to gain more information . Pt states for the past 2-3 days she has not felt well. She noted palpitations and indigestion.   Pt notes she had issues last fall with the same symptoms chills and she saw her PCP ( [facility])  who placed a 48 Hr Holter.  Will have Dr Vuong review Apple Watch tracings and review 48 Hr Holter from 9/2021 and call pt back w recommendations . Pt verified she is still taking Diltiazem  mg every day , Eliquis 5 mg BID and Omeprazole.   Pt voiced understanding and agreement with plan.   KHerroRN 10 am      Performed by [facility]  Summary:   1.  Symptoms were reported on 39 occasions, often just in association with   sinus rhythm but sometimes during sinus rhythm and  premature atrial   complexes.   2.  Overall PACs were rare (<1% of heartbeats).     Signature:   Gabe Ziegler MD       Indication Other general symptoms ( R68.89 )     Enrollment Period: 7 days 0 hours 08/24/21, 10:32am to 08/31/21, 10:10am   Analysis Time (after artifact removed): 6 days 23 hours     Total Triggers: 25 ,  Total Diaries: 14     Findings:   Patient had a min HR of 40 bpm, max sinus HR of 110 bpm, and avg sinus HR   of 62 bpm.   Predominant underlying rhythm was Sinus Rhythm.     First Degree AV Block was present.     8 Nonsustained Supraventricular Tachycardia runs occurred, the run with the    fastest interval lasting 7 beats with a max rate  of 214 bpm, the longest   lasting 16.7 secs with an avg rate of 104 bpm.  If not associated with   symptoms, these are  frequently a benign, incidental finding.     Premature atrial complexes and premature ventricular complexes were rare   (<1.0%).   Confirmed by MD GILLES, GABE ([id number]) on 9/15/2021 2:50:58 PM

## 2021-11-30 NOTE — TELEPHONE ENCOUNTER
Apple watch tracings show relatively frequent PVCs.  Overall PVC burden was low on a recent Holter (<1%).    Please reassure her.  They are  typically innocent.   Medications generally do not work well for PVCs, unless we use potent ones which I do not recommend.   It is reasonable to do a stress test to r/o ischemia.  A regular treadmill would be enough.     DI

## 2021-11-30 NOTE — TELEPHONE ENCOUNTER
12/30 Msg recd from Dr Vuong  Apple watch tracings show relatively frequent PVCs.  Overall PVC burden was low on a recent Holter (<1%).     Please reassure her.  They are  typically innocent.   Medications generally do not work well for PVCs, unless we use potent ones which I do not recommend.   It is reasonable to do a stress test to r/o ischemia.  A regular treadmill would be enough.     Pt updated via [employer]. Order placed for Exercise Stress Test  KHLuz 905 am

## 2021-12-02 ENCOUNTER — HOSPITAL ENCOUNTER (OUTPATIENT)
Dept: CARDIOLOGY | Facility: CLINIC | Age: 71
Discharge: HOME | End: 2021-12-02
Attending: INTERNAL MEDICINE | Admitting: INTERNAL MEDICINE
Payer: COMMERCIAL

## 2021-12-02 DIAGNOSIS — I49.3 PVC'S (PREMATURE VENTRICULAR CONTRACTIONS): ICD-10-CM

## 2021-12-02 PROCEDURE — 93016 CV STRESS TEST SUPVJ ONLY: CPT | Performed by: INTERNAL MEDICINE

## 2021-12-02 PROCEDURE — 93017 CV STRESS TEST TRACING ONLY: CPT

## 2021-12-02 PROCEDURE — 93018 CV STRESS TEST I&R ONLY: CPT | Performed by: INTERNAL MEDICINE

## 2022-02-11 DIAGNOSIS — I48.0 PAROXYSMAL ATRIAL FIBRILLATION (H): ICD-10-CM

## 2022-04-23 ENCOUNTER — HEALTH MAINTENANCE LETTER (OUTPATIENT)
Age: 72
End: 2022-04-23

## 2022-05-10 DIAGNOSIS — I48.0 PAROXYSMAL ATRIAL FIBRILLATION (H): ICD-10-CM

## 2022-06-15 DIAGNOSIS — I48.0 PAROXYSMAL ATRIAL FIBRILLATION (H): ICD-10-CM

## 2022-06-15 NOTE — TELEPHONE ENCOUNTER
MARISEL for pt to call scheduling to set up Annual OV with Dr Vuong. Refill was sent on 6/15 for 30 days. Som

## 2022-06-15 NOTE — TELEPHONE ENCOUNTER
[facility] Cardiology Refill Guideline reviewed.  Medication does not meet criteria for refill due to overdue for follow up.  Messaged to providers team for further review.

## 2022-06-20 DIAGNOSIS — I48.0 PAROXYSMAL ATRIAL FIBRILLATION (H): ICD-10-CM

## 2022-06-20 RX ORDER — DILTIAZEM HYDROCHLORIDE 180 MG/1
180 CAPSULE, COATED, EXTENDED RELEASE ORAL DAILY
Qty: 90 CAPSULE | Refills: 0 | Status: SHIPPED | OUTPATIENT
Start: 2022-06-20

## 2022-10-30 ENCOUNTER — HEALTH MAINTENANCE LETTER (OUTPATIENT)
Age: 72
End: 2022-10-30

## 2023-04-08 ENCOUNTER — HEALTH MAINTENANCE LETTER (OUTPATIENT)
Age: 73
End: 2023-04-08

## 2023-11-12 ENCOUNTER — HEALTH MAINTENANCE LETTER (OUTPATIENT)
Age: 73
End: 2023-11-12